# Patient Record
Sex: FEMALE | Race: WHITE | NOT HISPANIC OR LATINO | Employment: OTHER | URBAN - METROPOLITAN AREA
[De-identification: names, ages, dates, MRNs, and addresses within clinical notes are randomized per-mention and may not be internally consistent; named-entity substitution may affect disease eponyms.]

---

## 2020-05-20 NOTE — PATIENT DISCUSSION
CATARACT, OU - VISUALLY SIGNIFICANT. SCHEDULE PHACO WITH IOL OS SET FOR NEAR FIRST THEN OD SET FOR NEAR/INTERMEDIATE IF VISUAL SYMPTOMS PERSIST. GLASSES RX GIVEN TO FILL IF DESIRES IN THE EVENT PATIENT DOES NOT PROCEED WITH SURGERY.

## 2020-05-20 NOTE — PATIENT DISCUSSION
Surgery Counseling:  I have discussed the option of glasses versus cataract surgery versus following, It was explained that when vision no longer meets the patient's visual needs and a new prescription for glasses is not likely to improve the patient's visual symptoms, the option of cataract surgery is a reasonable next step. It was explained that there is no guarantee that removing the cataract will improve their visual symptoms; however, it is believed that the cataract is contributing to the patient's visual impairment and surgery may noticeably improve both the visual and functional status of the patient. 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

## 2020-06-30 NOTE — PATIENT DISCUSSION
REFRACTIVE ERROR - ASTIGMATISM:  PATIENT DESIRES TO HAVE THEIR REFRACTIVE ERROR SURGICALLY CORRECTED WITH  THE FEMTOSECOND LASER.

## 2020-06-30 NOTE — PATIENT DISCUSSION
New Prescription: Pred Forte (prednisolone acetate): drops,suspension: 1% 1 drop three times a day as directed into left eye 06-

## 2020-06-30 NOTE — PATIENT DISCUSSION
CATARACT, OU - VISUALLY SIGNIFICANT. SCHEDULE PHACO WITH IOL OS SET FOR NEAR FIRST THEN OD SET FOR INTERMEDIATE / NEAR IF VISUAL SYMPTOMS PERSIST.

## 2020-07-15 NOTE — PATIENT DISCUSSION
Continue: Pred Forte (prednisolone acetate): drops,suspension: 1% 1 drop three times a day as directed into left eye 06-

## 2020-07-22 NOTE — PATIENT DISCUSSION
S/P PC IOL, OS. STABLE. CONTINUE DROPS AS DIRECTED. RETURN FOR FOLLOW-UP AS SCHEDULED WITH DR. RUTLEDGE.

## 2020-07-27 NOTE — PATIENT DISCUSSION
Taper as directed in left eye. Start immediately after surgery in right eye  and continue as directed.

## 2020-07-27 NOTE — PATIENT DISCUSSION
Continue: moxifloxacin (moxifloxacin): drops, viscous: 0.5% 1 drop three times a day as directed into both eyes

## 2020-07-27 NOTE — PATIENT DISCUSSION
*Pre-Op 2nd Eye Counseling: The patient has noticed an improvement in their visual symptoms in the operative eye. The patient complains of decreased vision in the fellow eye when driving at night. It was explained to the patient that the decision to proceed with cataract surgery in the fellow eye is entirely a separate decision from the surgical eye. All of the same risks, benefits and alternatives ere reviewed with the patient again. The patient does feel the vision in the non-operative eye is limiting their daily activities and elects to proceed with surgery in the cataract eye.

## 2020-07-27 NOTE — PATIENT DISCUSSION
Continue: Pred Forte (prednisolone acetate): drops,suspension: 1% 1 drop three times a day as directed into both eyes 06-

## 2020-07-27 NOTE — PATIENT DISCUSSION
CATARACT, OD: VISUALLY SIGNIFICANT. SCHEDULE PHACO WITH IOL SET FOR DISTANCE. IOL MASTER REVIEWED AND INTERPRETED ON H&amp;P SHEET FOR OD TODAY. CONSENTS FOR SECOND EYE DISCUSSED WITH PATIENT TODAY. PATIENT UNDERSTANDS AND HAS NO FURTHER QUESTIONS.

## 2020-08-14 NOTE — PATIENT DISCUSSION
ADD SIMBRINZA BID TO CONTROL IOP UNTIL NEXT POST OP VISIT. CONTINUE TO TAPER DROPS AS DIRECTED. DISCONTINUE ANTIBIOTIC DROP IN 1 WEEK. RETURN FOR FOLLOW-UP AS SCHEDULED.

## 2020-08-14 NOTE — PATIENT DISCUSSION
Continue: moxifloxacin (moxifloxacin): drops, viscous: 0.5% 1 drop three times a day as directed into right eye

## 2020-08-14 NOTE — PATIENT DISCUSSION
S/P PHACO W/IOL,OD:  ELEVATED IOP. INSTILLED ONE DROP OF GLAUCOMA DROPS COMBIGAN @1:11 pm. REPEAT IOP CHECK BY TECHNICIAN @1:39pm. IOP NOT REDUCED SIGNIFICANTLY ENOUGH WITH TOPICAL TREATMENT IN OFFICE. FLUID RELEASED FROM AB EXTERNA INCISION AT SLIT LAMP WITHOUT DIFFICULTY. REPEAT IOP CHECK BY DR. Waldemar Ball ([id number]). INSTILLED ONE DROP OF ANTIBIOTIC IMMEDIATELY.

## 2020-08-14 NOTE — PATIENT DISCUSSION
New Prescription: Vaishali Malcolm (brinzolamide-brimonidine): drops,suspension: 1-0.2% 1 drop twice a day into right eye 08-

## 2020-09-01 NOTE — PATIENT DISCUSSION
Stopped Today: Simbrinza (brinzolamide-brimonidine): drops,suspension: 1-0.2% 1 drop twice a day into right eye 08-

## 2020-09-01 NOTE — PATIENT DISCUSSION
Stopped Today: moxifloxacin (moxifloxacin): drops, viscous: 0.5% 1 drop three times a day as directed into right eye

## 2022-02-03 ENCOUNTER — NEW PATIENT (OUTPATIENT)
Dept: URBAN - METROPOLITAN AREA CLINIC 25 | Facility: CLINIC | Age: 62
End: 2022-02-03

## 2022-02-03 DIAGNOSIS — Z96.1: ICD-10-CM

## 2022-02-03 DIAGNOSIS — H52.4: ICD-10-CM

## 2022-02-03 DIAGNOSIS — H35.352: ICD-10-CM

## 2022-02-03 PROCEDURE — 92004 COMPRE OPH EXAM NEW PT 1/>: CPT

## 2022-02-03 PROCEDURE — 92134 CPTRZ OPH DX IMG PST SGM RTA: CPT

## 2022-02-03 PROCEDURE — 92015 DETERMINE REFRACTIVE STATE: CPT

## 2022-02-03 ASSESSMENT — KERATOMETRY
OS_AXISANGLE_DEGREES: 177
OS_AXISANGLE2_DEGREES: 87
OD_K1POWER_DIOPTERS: 41.75
OD_AXISANGLE_DEGREES: 2
OD_AXISANGLE2_DEGREES: 92
OS_K1POWER_DIOPTERS: 41.50
OS_K2POWER_DIOPTERS: 43.25
OD_K2POWER_DIOPTERS: 43.50

## 2022-02-03 ASSESSMENT — VISUAL ACUITY
OD_CC: 20/25
OS_CC: CF 5FT

## 2022-02-03 ASSESSMENT — TONOMETRY
OS_IOP_MMHG: 13
OD_IOP_MMHG: 12

## 2022-04-21 ENCOUNTER — FOLLOW UP (OUTPATIENT)
Dept: URBAN - METROPOLITAN AREA CLINIC 25 | Facility: CLINIC | Age: 62
End: 2022-04-21

## 2022-04-21 DIAGNOSIS — H52.4: ICD-10-CM

## 2022-04-21 DIAGNOSIS — H35.352: ICD-10-CM

## 2022-04-21 DIAGNOSIS — Z96.1: ICD-10-CM

## 2022-04-21 PROCEDURE — 99213 OFFICE O/P EST LOW 20 MIN: CPT

## 2022-04-21 PROCEDURE — 92015 DETERMINE REFRACTIVE STATE: CPT

## 2022-04-21 ASSESSMENT — KERATOMETRY
OD_AXISANGLE2_DEGREES: 92
OD_K2POWER_DIOPTERS: 44.00
OS_AXISANGLE2_DEGREES: 83
OS_K2POWER_DIOPTERS: 43.25
OS_K2POWER_DIOPTERS: 44.00
OD_AXISANGLE_DEGREES: 2
OD_K1POWER_DIOPTERS: 41.75
OS_AXISANGLE_DEGREES: 173
OS_AXISANGLE2_DEGREES: 87
OS_K1POWER_DIOPTERS: 41.50
OS_K1POWER_DIOPTERS: 42.00
OD_K2POWER_DIOPTERS: 43.50
OS_AXISANGLE_DEGREES: 177

## 2022-04-21 ASSESSMENT — VISUAL ACUITY
OD_SC: 20/60
OS_SC: CF 5FT

## 2022-04-21 NOTE — PATIENT DISCUSSION
Refer to Dr. John Regan for PCO evaluation.  Patient only wants to proceed with OS YAG at this time.

## 2022-06-04 ENCOUNTER — TELEPHONE ENCOUNTER (OUTPATIENT)
Dept: URBAN - METROPOLITAN AREA CLINIC 68 | Facility: CLINIC | Age: 62
End: 2022-06-04

## 2022-06-05 ENCOUNTER — TELEPHONE ENCOUNTER (OUTPATIENT)
Dept: URBAN - METROPOLITAN AREA CLINIC 68 | Facility: CLINIC | Age: 62
End: 2022-06-05

## 2022-06-17 ENCOUNTER — COMPREHENSIVE EXAM (OUTPATIENT)
Dept: URBAN - METROPOLITAN AREA CLINIC 32 | Facility: CLINIC | Age: 62
End: 2022-06-17

## 2022-06-17 DIAGNOSIS — H35.352: ICD-10-CM

## 2022-06-17 DIAGNOSIS — Z96.1: ICD-10-CM

## 2022-06-17 DIAGNOSIS — H26.491: ICD-10-CM

## 2022-06-17 DIAGNOSIS — H52.4: ICD-10-CM

## 2022-06-17 PROCEDURE — 92004 COMPRE OPH EXAM NEW PT 1/>: CPT

## 2022-06-17 PROCEDURE — 92015 DETERMINE REFRACTIVE STATE: CPT

## 2022-06-17 ASSESSMENT — KERATOMETRY
OD_K2POWER_DIOPTERS: 43.50
OS_AXISANGLE_DEGREES: 173
OS_K2POWER_DIOPTERS: 44.00
OS_K1POWER_DIOPTERS: 41.50
OS_K1POWER_DIOPTERS: 42.00
OD_K2POWER_DIOPTERS: 44.00
OS_AXISANGLE2_DEGREES: 83
OS_AXISANGLE2_DEGREES: 87
OD_K1POWER_DIOPTERS: 41.75
OS_K2POWER_DIOPTERS: 43.25
OD_AXISANGLE_DEGREES: 2
OD_AXISANGLE2_DEGREES: 92
OS_AXISANGLE_DEGREES: 177

## 2022-06-25 ENCOUNTER — TELEPHONE ENCOUNTER (OUTPATIENT)
Age: 62
End: 2022-06-25

## 2022-06-26 ENCOUNTER — TELEPHONE ENCOUNTER (OUTPATIENT)
Age: 62
End: 2022-06-26

## 2022-09-06 ENCOUNTER — FOLLOW UP (OUTPATIENT)
Dept: URBAN - METROPOLITAN AREA CLINIC 25 | Facility: CLINIC | Age: 62
End: 2022-09-06

## 2022-09-06 DIAGNOSIS — H26.491: ICD-10-CM

## 2022-09-06 DIAGNOSIS — H35.352: ICD-10-CM

## 2022-09-06 DIAGNOSIS — Z96.1: ICD-10-CM

## 2022-09-06 DIAGNOSIS — H52.4: ICD-10-CM

## 2022-09-06 PROCEDURE — 92134 CPTRZ OPH DX IMG PST SGM RTA: CPT

## 2022-09-06 PROCEDURE — 99214 OFFICE O/P EST MOD 30 MIN: CPT

## 2022-09-06 ASSESSMENT — KERATOMETRY
OS_AXISANGLE_DEGREES: 177
OS_K1POWER_DIOPTERS: 42.00
OD_K2POWER_DIOPTERS: 43.50
OS_AXISANGLE_DEGREES: 173
OD_AXISANGLE2_DEGREES: 92
OS_K1POWER_DIOPTERS: 41.50
OD_K1POWER_DIOPTERS: 41.75
OS_K2POWER_DIOPTERS: 43.25
OD_AXISANGLE_DEGREES: 2
OS_AXISANGLE2_DEGREES: 87
OS_AXISANGLE2_DEGREES: 83
OD_K2POWER_DIOPTERS: 44.00
OS_K2POWER_DIOPTERS: 44.00

## 2022-09-06 ASSESSMENT — VISUAL ACUITY
OD_CC: 20/30+2
OS_CC: 20/200

## 2022-09-06 NOTE — PATIENT DISCUSSION
Consider YAG OD to improve visual function if cleared by retina specialist.  Per patient request, seek second opinion.  Refer to [facility] retina specialist for evaluation near [facility]. If proceeding with YAG, refer to Dr. Ramos of [facility].

## 2022-10-11 ENCOUNTER — CONSULTATION/EVALUATION (OUTPATIENT)
Dept: URBAN - METROPOLITAN AREA CLINIC 43 | Facility: CLINIC | Age: 62
End: 2022-10-11

## 2022-10-11 DIAGNOSIS — H04.123: ICD-10-CM

## 2022-10-11 DIAGNOSIS — H33.001: ICD-10-CM

## 2022-10-11 DIAGNOSIS — H31.003: ICD-10-CM

## 2022-10-11 DIAGNOSIS — H35.352: ICD-10-CM

## 2022-10-11 PROCEDURE — 92235 FLUORESCEIN ANGRPH MLTIFRAME: CPT

## 2022-10-11 PROCEDURE — 92250 FUNDUS PHOTOGRAPHY W/I&R: CPT

## 2022-10-11 PROCEDURE — 99214 OFFICE O/P EST MOD 30 MIN: CPT

## 2022-10-11 ASSESSMENT — KERATOMETRY
OS_K2POWER_DIOPTERS: 43.25
OS_K2POWER_DIOPTERS: 44.00
OD_K2POWER_DIOPTERS: 43.50
OD_K1POWER_DIOPTERS: 41.75
OS_AXISANGLE2_DEGREES: 83
OD_AXISANGLE2_DEGREES: 92
OD_AXISANGLE_DEGREES: 2
OD_K2POWER_DIOPTERS: 44.00
OS_AXISANGLE_DEGREES: 173
OS_K1POWER_DIOPTERS: 41.50
OS_AXISANGLE_DEGREES: 177
OS_AXISANGLE2_DEGREES: 87
OS_K1POWER_DIOPTERS: 42.00

## 2022-10-11 ASSESSMENT — TONOMETRY
OS_IOP_MMHG: 17
OD_IOP_MMHG: 16

## 2022-10-11 ASSESSMENT — VISUAL ACUITY
OS_CC: 20/100
OD_CC: 20/30-1

## 2022-10-17 ENCOUNTER — FOLLOW UP (OUTPATIENT)
Dept: URBAN - METROPOLITAN AREA CLINIC 25 | Facility: CLINIC | Age: 62
End: 2022-10-17

## 2022-10-17 DIAGNOSIS — Z96.1: ICD-10-CM

## 2022-10-17 DIAGNOSIS — H35.352: ICD-10-CM

## 2022-10-17 DIAGNOSIS — H53.2: ICD-10-CM

## 2022-10-17 PROCEDURE — 99213 OFFICE O/P EST LOW 20 MIN: CPT

## 2022-10-17 ASSESSMENT — KERATOMETRY
OD_K2POWER_DIOPTERS: 43.50
OD_AXISANGLE_DEGREES: 2
OS_AXISANGLE2_DEGREES: 87
OS_AXISANGLE_DEGREES: 173
OD_AXISANGLE2_DEGREES: 92
OS_AXISANGLE2_DEGREES: 83
OS_K1POWER_DIOPTERS: 42.00
OS_K2POWER_DIOPTERS: 43.25
OD_K2POWER_DIOPTERS: 44.00
OS_K1POWER_DIOPTERS: 41.50
OS_AXISANGLE_DEGREES: 177
OS_K2POWER_DIOPTERS: 44.00
OD_K1POWER_DIOPTERS: 41.75

## 2022-10-17 ASSESSMENT — VISUAL ACUITY
OS_CC: 20/100
OD_CC: 20/40

## 2022-10-17 NOTE — PATIENT DISCUSSION
Long Standing CME, VA stable as compared to 5 months ago, can consider GTTS, but unlikely to improve VA,.

## 2022-10-17 NOTE — PATIENT DISCUSSION
After reviewing with patient, no diplopia is present.  C/O 'double vision' is related to vision not being clear.  Will refract after patient chooses to proceed with YAG.  Patient prefers to wait on YAG at this time.

## 2022-10-17 NOTE — PATIENT DISCUSSION
Pt  is cleared for Yag Laser when pt desires, Discussed with pt the risks of Yag Laser with history of RD.

## 2023-01-30 ENCOUNTER — CONSULTATION/EVALUATION (OUTPATIENT)
Dept: URBAN - METROPOLITAN AREA CLINIC 39 | Facility: CLINIC | Age: 63
End: 2023-01-30

## 2023-01-30 ENCOUNTER — SURGERY/PROCEDURE (OUTPATIENT)
Dept: URBAN - METROPOLITAN AREA SURGERY 14 | Facility: SURGERY | Age: 63
End: 2023-01-30

## 2023-01-30 DIAGNOSIS — Z96.1: ICD-10-CM

## 2023-01-30 DIAGNOSIS — H33.001: ICD-10-CM

## 2023-01-30 DIAGNOSIS — H35.371: ICD-10-CM

## 2023-01-30 DIAGNOSIS — H26.491: ICD-10-CM

## 2023-01-30 DIAGNOSIS — H35.30: ICD-10-CM

## 2023-01-30 DIAGNOSIS — H35.372: ICD-10-CM

## 2023-01-30 DIAGNOSIS — H52.4: ICD-10-CM

## 2023-01-30 DIAGNOSIS — H53.2: ICD-10-CM

## 2023-01-30 DIAGNOSIS — H43.811: ICD-10-CM

## 2023-01-30 DIAGNOSIS — H31.003: ICD-10-CM

## 2023-01-30 DIAGNOSIS — H04.123: ICD-10-CM

## 2023-01-30 PROCEDURE — 66821 AFTER CATARACT LASER SURGERY: CPT

## 2023-01-30 PROCEDURE — 99214 OFFICE O/P EST MOD 30 MIN: CPT

## 2023-01-30 ASSESSMENT — TONOMETRY
OS_IOP_MMHG: 22
OD_IOP_MMHG: 18

## 2023-01-30 ASSESSMENT — VISUAL ACUITY
OD_BAT: 20/400
OD_CC: 20/30+1

## 2023-01-30 NOTE — PROCEDURE NOTE: SURGICAL
<p>Prior to commencing surgery patient identification, surgical procedure, site, and side were confirmed by Dr. Caitlyn Segovia. Following topical proparacaine anesthesia, the patient was positioned at the YAG laser, a contact lens coupled to the cornea with methylcellulose and an axial posterior capsulotomy performed without complication using 3.4 Mj x 32. Excess methylcellulose was washed from the eye, one drop of Alphagan was instilled and the patient returned to the holding area having tolerated the procedure well and without complication. </p><p> [id number]</p>

## 2023-01-30 NOTE — PATIENT DISCUSSION
Pt  is cleared for Yag Laser when pt desires, Discussed with pt the risks of Yag Laser with history of RD. normal...

## 2024-03-13 NOTE — PATIENT DISCUSSION
Advised to call immediately if any worsening distortion or blurring is noted.
Advised to call immediately if eye pain or loss of vision.
Avoid ocular irritants which include smoke, paint fumes, moving air, etc.
Discussed lid hygiene, warm compress and eyelid wash.
Explained that scars are limiting the vision and cannot be treated.
Follow back up with .
Long Standing CME, VA stable as compared to 5 months ago, can consider GTTS, but unlikely to improve VA,.
Longstanding, monitor.
Monitor.
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
Patient agrees to observe at this time and will call with any changes.
Patient given Rx for glasses.  Polycarbonate for safety.
Patient will wait for now, and will consider YAG IN 6 MONTHS.
Pt  is cleared for Yag Laser when pt desires, Discussed with pt the risks of Yag Laser with history of RD.
Reassured patient that there were no tears, holes, or detachment seen on the careful exam today.
Recurrent Trace ERM, Monitor.
Retinal detachment warnings given.
Retinal tear and detachment warning symptoms reviewed and patient instructed to call immediately if increasing floaters, flashes, or decreasing peripheral vision.
Stable no recurrence of RD, Monitor.
Stable, No Recurrence of RD, No Holes or tears.
Trace ERM, The membrane is not visually significant.
Use artificial tears to affected eye(s) as needed.
Cigarettes